# Patient Record
Sex: FEMALE | URBAN - METROPOLITAN AREA
[De-identification: names, ages, dates, MRNs, and addresses within clinical notes are randomized per-mention and may not be internally consistent; named-entity substitution may affect disease eponyms.]

---

## 2024-05-10 ENCOUNTER — APPOINTMENT (RX ONLY)
Dept: URBAN - METROPOLITAN AREA CLINIC 94 | Facility: CLINIC | Age: 54
Setting detail: DERMATOLOGY
End: 2024-05-10

## 2024-05-10 DIAGNOSIS — L71.8 OTHER ROSACEA: ICD-10-CM | Status: INADEQUATELY CONTROLLED

## 2024-05-10 PROBLEM — L30.9 DERMATITIS, UNSPECIFIED: Status: ACTIVE | Noted: 2024-05-10

## 2024-05-10 PROCEDURE — 99204 OFFICE O/P NEW MOD 45 MIN: CPT

## 2024-05-10 PROCEDURE — ? PRESCRIPTION

## 2024-05-10 PROCEDURE — ? PRESCRIPTION MEDICATION MANAGEMENT

## 2024-05-10 PROCEDURE — ? COUNSELING

## 2024-05-10 ASSESSMENT — LOCATION SIMPLE DESCRIPTION DERM
LOCATION SIMPLE: LEFT CHEEK
LOCATION SIMPLE: RIGHT CHEEK

## 2024-05-10 ASSESSMENT — LOCATION DETAILED DESCRIPTION DERM
LOCATION DETAILED: LEFT INFERIOR CENTRAL MALAR CHEEK
LOCATION DETAILED: RIGHT INFERIOR CENTRAL MALAR CHEEK

## 2024-05-10 ASSESSMENT — LOCATION ZONE DERM: LOCATION ZONE: FACE

## 2024-05-10 NOTE — HPI: RASH
Render In Strict Bullet Format?: No
Plan: 5mm eroded segment of nail noted likely traumatic. Allow nail to grow out in 3 months.  Soak left foot in 1/3 water, 1/3 yellow listerine and 1/3 vinegar daily
Detail Level: Zone
Is This A New Presentation, Or A Follow-Up?: Rash

## 2024-05-10 NOTE — PROCEDURE: PRESCRIPTION MEDICATION MANAGEMENT
Render In Strict Bullet Format?: No
Initiate Treatment: AP - Rosacea Triple Gel - [id number] \\nApply daily to face for rosacea
Detail Level: Detailed
Plan: Patient notes this red rash and flushing has been ongoing for about 3 years.  She notes she flares after applying any product to her skin including sunscreen.  Has tried Vanicream products and notes she reacts to this brand as well.  Has had allergy testing done without significant results (in attachments).  She is aware that this is not a definitive diagnosis.  Mild signs of rosacea today with telangiectasias and 2-3 small acne like bumps on malar cheeks. \\nAdvised patient to take photographs when flaring and call office for work-in appt.  Ernestoeis shawl-like rash and joint pains. \\nFollow up in 3 months, sooner for flare. \\nWe discussed trialing all new products on inner arm, waiting 72-hours, and only using on face if no reaction.
Samples Given: Cetaphil sensitive skin sunscreen. La Roche Posay sensitive skin sunscreen